# Patient Record
Sex: MALE | Race: OTHER | NOT HISPANIC OR LATINO
[De-identification: names, ages, dates, MRNs, and addresses within clinical notes are randomized per-mention and may not be internally consistent; named-entity substitution may affect disease eponyms.]

---

## 2017-02-27 PROBLEM — Z00.00 ENCOUNTER FOR PREVENTIVE HEALTH EXAMINATION: Status: ACTIVE | Noted: 2017-02-27

## 2017-03-01 ENCOUNTER — APPOINTMENT (OUTPATIENT)
Dept: GASTROENTEROLOGY | Facility: CLINIC | Age: 32
End: 2017-03-01

## 2017-03-01 VITALS
HEIGHT: 71 IN | DIASTOLIC BLOOD PRESSURE: 82 MMHG | OXYGEN SATURATION: 98 % | HEART RATE: 74 BPM | RESPIRATION RATE: 14 BRPM | WEIGHT: 185 LBS | SYSTOLIC BLOOD PRESSURE: 118 MMHG | BODY MASS INDEX: 25.9 KG/M2 | TEMPERATURE: 98.2 F

## 2017-03-01 DIAGNOSIS — Z87.442 PERSONAL HISTORY OF URINARY CALCULI: ICD-10-CM

## 2017-03-01 DIAGNOSIS — D55.0 ANEMIA DUE TO GLUCOSE-6-PHOSPHATE DEHYDROGENASE [G6PD] DEFICIENCY: ICD-10-CM

## 2017-03-01 DIAGNOSIS — Z83.79 FAMILY HISTORY OF OTHER DISEASES OF THE DIGESTIVE SYSTEM: ICD-10-CM

## 2017-03-01 DIAGNOSIS — Z78.9 OTHER SPECIFIED HEALTH STATUS: ICD-10-CM

## 2017-03-01 DIAGNOSIS — K50.919 CROHN'S DISEASE, UNSPECIFIED, WITH UNSPECIFIED COMPLICATIONS: ICD-10-CM

## 2017-03-01 DIAGNOSIS — Z80.8 FAMILY HISTORY OF MALIGNANT NEOPLASM OF OTHER ORGANS OR SYSTEMS: ICD-10-CM

## 2017-03-01 RX ORDER — HYDROCORTISONE 100 MG/60ML
ENEMA RECTAL
Refills: 0 | Status: DISCONTINUED | COMMUNITY

## 2017-03-01 RX ORDER — MESALAMINE 4 G/60ML
4 SUSPENSION RECTAL
Refills: 0 | Status: DISCONTINUED | COMMUNITY

## 2017-03-03 ENCOUNTER — RX RENEWAL (OUTPATIENT)
Age: 32
End: 2017-03-03

## 2017-03-12 ENCOUNTER — FORM ENCOUNTER (OUTPATIENT)
Age: 32
End: 2017-03-12

## 2017-03-13 ENCOUNTER — OUTPATIENT (OUTPATIENT)
Dept: OUTPATIENT SERVICES | Facility: HOSPITAL | Age: 32
LOS: 1 days | End: 2017-03-13
Payer: COMMERCIAL

## 2017-03-13 PROCEDURE — 74177 CT ABD & PELVIS W/CONTRAST: CPT | Mod: 26

## 2017-03-13 PROCEDURE — 74177 CT ABD & PELVIS W/CONTRAST: CPT

## 2017-03-14 ENCOUNTER — APPOINTMENT (OUTPATIENT)
Dept: GASTROENTEROLOGY | Facility: HOSPITAL | Age: 32
End: 2017-03-14

## 2017-03-14 LAB
ALBUMIN SERPL ELPH-MCNC: 4.8 G/DL
ALP BLD-CCNC: 65 U/L
ALT SERPL-CCNC: 11 U/L
ANION GAP SERPL CALC-SCNC: 16 MMOL/L
AST SERPL-CCNC: 14 U/L
BASOPHILS # BLD AUTO: 0.1 K/UL
BASOPHILS NFR BLD AUTO: 1 %
BILIRUB SERPL-MCNC: 0.7 MG/DL
BUN SERPL-MCNC: 8 MG/DL
CALCIUM SERPL-MCNC: 10.2 MG/DL
CHLORIDE SERPL-SCNC: 101 MMOL/L
CO2 SERPL-SCNC: 25 MMOL/L
CREAT SERPL-MCNC: 0.93 MG/DL
EOSINOPHIL # BLD AUTO: 0.38 K/UL
EOSINOPHIL NFR BLD AUTO: 3.9 %
GLUCOSE SERPL-MCNC: 85 MG/DL
HBV CORE IGM SER QL: NONREACTIVE
HBV SURFACE AB SER QL: REACTIVE
HBV SURFACE AB SERPL IA-ACNC: 469.8 MIU/ML
HBV SURFACE AG SER QL: NONREACTIVE
HCT VFR BLD CALC: 47.4 %
HCV AB SER QL: NONREACTIVE
HCV S/CO RATIO: 0.11 S/CO
HGB BLD-MCNC: 15.7 G/DL
IMM GRANULOCYTES NFR BLD AUTO: 0.3 %
LYMPHOCYTES # BLD AUTO: 2.16 K/UL
LYMPHOCYTES NFR BLD AUTO: 22.4 %
MAN DIFF?: NORMAL
MCHC RBC-ENTMCNC: 31.3 PG
MCHC RBC-ENTMCNC: 33.1 GM/DL
MCV RBC AUTO: 94.6 FL
MONOCYTES # BLD AUTO: 0.58 K/UL
MONOCYTES NFR BLD AUTO: 6 %
NEUTROPHILS # BLD AUTO: 6.41 K/UL
NEUTROPHILS NFR BLD AUTO: 66.4 %
PLATELET # BLD AUTO: 184 K/UL
POTASSIUM SERPL-SCNC: 4.1 MMOL/L
PROT SERPL-MCNC: 7.7 G/DL
RBC # BLD: 5.01 M/UL
RBC # FLD: 12.6 %
SODIUM SERPL-SCNC: 142 MMOL/L
VIT B12 SERPL-MCNC: 385 PG/ML
WBC # FLD AUTO: 9.66 K/UL

## 2017-03-16 ENCOUNTER — RESULT REVIEW (OUTPATIENT)
Age: 32
End: 2017-03-16

## 2017-03-17 ENCOUNTER — OUTPATIENT (OUTPATIENT)
Dept: OUTPATIENT SERVICES | Facility: HOSPITAL | Age: 32
LOS: 1 days | Discharge: HOME | End: 2017-03-17
Payer: COMMERCIAL

## 2017-03-17 ENCOUNTER — APPOINTMENT (OUTPATIENT)
Dept: GASTROENTEROLOGY | Facility: HOSPITAL | Age: 32
End: 2017-03-17

## 2017-03-17 DIAGNOSIS — K50.90 CROHN'S DISEASE, UNSPECIFIED, WITHOUT COMPLICATIONS: ICD-10-CM

## 2017-03-17 DIAGNOSIS — K62.89 OTHER SPECIFIED DISEASES OF ANUS AND RECTUM: ICD-10-CM

## 2017-03-17 DIAGNOSIS — Z88.2 ALLERGY STATUS TO SULFONAMIDES: ICD-10-CM

## 2017-03-17 DIAGNOSIS — K64.4 RESIDUAL HEMORRHOIDAL SKIN TAGS: ICD-10-CM

## 2017-03-17 DIAGNOSIS — K61.1 RECTAL ABSCESS: ICD-10-CM

## 2017-03-17 DIAGNOSIS — D55.0 ANEMIA DUE TO GLUCOSE-6-PHOSPHATE DEHYDROGENASE [G6PD] DEFICIENCY: ICD-10-CM

## 2017-03-17 DIAGNOSIS — K52.89 OTHER SPECIFIED NONINFECTIVE GASTROENTERITIS AND COLITIS: ICD-10-CM

## 2017-03-17 DIAGNOSIS — Z88.8 ALLERGY STATUS TO OTHER DRUGS, MEDICAMENTS AND BIOLOGICAL SUBSTANCES STATUS: ICD-10-CM

## 2017-03-17 PROCEDURE — 88305 TISSUE EXAM BY PATHOLOGIST: CPT

## 2017-03-17 PROCEDURE — 45380 COLONOSCOPY AND BIOPSY: CPT

## 2017-03-17 PROCEDURE — 45380 COLONOSCOPY AND BIOPSY: CPT | Mod: GC

## 2017-03-18 LAB
C DIFF GDH STL QL: NEGATIVE — SIGNIFICANT CHANGE UP
C DIFF GDH STL QL: SIGNIFICANT CHANGE UP

## 2017-03-21 ENCOUNTER — RX RENEWAL (OUTPATIENT)
Age: 32
End: 2017-03-21

## 2017-03-21 LAB
SURGICAL PATHOLOGY STUDY: SIGNIFICANT CHANGE UP
TPMT ENZYME: NORMAL

## 2017-04-06 ENCOUNTER — APPOINTMENT (OUTPATIENT)
Dept: GASTROENTEROLOGY | Facility: CLINIC | Age: 32
End: 2017-04-06

## 2017-04-06 VITALS
RESPIRATION RATE: 14 BRPM | SYSTOLIC BLOOD PRESSURE: 124 MMHG | HEART RATE: 70 BPM | OXYGEN SATURATION: 98 % | TEMPERATURE: 97.9 F | HEIGHT: 71 IN | WEIGHT: 178 LBS | DIASTOLIC BLOOD PRESSURE: 84 MMHG | BODY MASS INDEX: 24.92 KG/M2

## 2017-04-10 LAB
ADJUSTED MITOGEN: >10 IU/ML
ADJUSTED TB AG: 0 IU/ML
M TB IFN-G BLD-IMP: NEGATIVE
QUANTIFERON GOLD NIL: 0.05 IU/ML

## 2017-05-02 ENCOUNTER — RX RENEWAL (OUTPATIENT)
Age: 32
End: 2017-05-02

## 2017-05-30 ENCOUNTER — RX RENEWAL (OUTPATIENT)
Age: 32
End: 2017-05-30

## 2017-07-20 ENCOUNTER — RX RENEWAL (OUTPATIENT)
Age: 32
End: 2017-07-20

## 2017-08-04 ENCOUNTER — APPOINTMENT (OUTPATIENT)
Dept: GASTROENTEROLOGY | Facility: CLINIC | Age: 32
End: 2017-08-04
Payer: COMMERCIAL

## 2017-08-04 VITALS
HEART RATE: 71 BPM | WEIGHT: 174 LBS | BODY MASS INDEX: 24.36 KG/M2 | SYSTOLIC BLOOD PRESSURE: 114 MMHG | TEMPERATURE: 98.2 F | HEIGHT: 71 IN | OXYGEN SATURATION: 98 % | RESPIRATION RATE: 14 BRPM | DIASTOLIC BLOOD PRESSURE: 76 MMHG

## 2017-08-04 PROCEDURE — 99214 OFFICE O/P EST MOD 30 MIN: CPT | Mod: 25

## 2017-08-04 PROCEDURE — 36415 COLL VENOUS BLD VENIPUNCTURE: CPT

## 2017-08-07 LAB
ALBUMIN SERPL ELPH-MCNC: 4.4 G/DL
ALP BLD-CCNC: 63 U/L
ALT SERPL-CCNC: 7 U/L
ANION GAP SERPL CALC-SCNC: 14 MMOL/L
AST SERPL-CCNC: 18 U/L
BASOPHILS # BLD AUTO: 0.05 K/UL
BASOPHILS NFR BLD AUTO: 1 %
BILIRUB SERPL-MCNC: 0.7 MG/DL
BUN SERPL-MCNC: 7 MG/DL
CALCIUM SERPL-MCNC: 10 MG/DL
CHLORIDE SERPL-SCNC: 103 MMOL/L
CO2 SERPL-SCNC: 21 MMOL/L
CREAT SERPL-MCNC: 0.92 MG/DL
CRP SERPL-MCNC: <0.2 MG/DL
EOSINOPHIL # BLD AUTO: 0.29 K/UL
EOSINOPHIL NFR BLD AUTO: 5.6 %
GLUCOSE SERPL-MCNC: 77 MG/DL
HCT VFR BLD CALC: 44.8 %
HGB BLD-MCNC: 14.7 G/DL
IMM GRANULOCYTES NFR BLD AUTO: 0.4 %
LYMPHOCYTES # BLD AUTO: 2.02 K/UL
LYMPHOCYTES NFR BLD AUTO: 38.9 %
MAN DIFF?: NORMAL
MCHC RBC-ENTMCNC: 31.1 PG
MCHC RBC-ENTMCNC: 32.8 GM/DL
MCV RBC AUTO: 94.9 FL
MONOCYTES # BLD AUTO: 0.55 K/UL
MONOCYTES NFR BLD AUTO: 10.6 %
NEUTROPHILS # BLD AUTO: 2.26 K/UL
NEUTROPHILS NFR BLD AUTO: 43.5 %
PLATELET # BLD AUTO: 189 K/UL
POTASSIUM SERPL-SCNC: 4.1 MMOL/L
PROT SERPL-MCNC: 7.5 G/DL
RBC # BLD: 4.72 M/UL
RBC # FLD: 12.9 %
SODIUM SERPL-SCNC: 138 MMOL/L
WBC # FLD AUTO: 5.19 K/UL

## 2017-08-23 ENCOUNTER — RX RENEWAL (OUTPATIENT)
Age: 32
End: 2017-08-23

## 2017-09-20 ENCOUNTER — APPOINTMENT (OUTPATIENT)
Dept: GASTROENTEROLOGY | Facility: CLINIC | Age: 32
End: 2017-09-20
Payer: COMMERCIAL

## 2017-09-20 ENCOUNTER — LABORATORY RESULT (OUTPATIENT)
Age: 32
End: 2017-09-20

## 2017-09-20 VITALS
RESPIRATION RATE: 14 BRPM | TEMPERATURE: 98.2 F | HEART RATE: 59 BPM | DIASTOLIC BLOOD PRESSURE: 78 MMHG | OXYGEN SATURATION: 98 % | HEIGHT: 71 IN | SYSTOLIC BLOOD PRESSURE: 100 MMHG | WEIGHT: 184 LBS | BODY MASS INDEX: 25.76 KG/M2

## 2017-09-20 PROCEDURE — 99214 OFFICE O/P EST MOD 30 MIN: CPT | Mod: GC

## 2017-10-02 ENCOUNTER — RESULT REVIEW (OUTPATIENT)
Age: 32
End: 2017-10-02

## 2017-10-11 LAB — CALPROTECTIN FECAL: 172 UG/G

## 2017-10-30 ENCOUNTER — RX RENEWAL (OUTPATIENT)
Age: 32
End: 2017-10-30

## 2017-11-21 ENCOUNTER — RX RENEWAL (OUTPATIENT)
Age: 32
End: 2017-11-21

## 2017-12-07 ENCOUNTER — APPOINTMENT (OUTPATIENT)
Dept: GASTROENTEROLOGY | Facility: HOSPITAL | Age: 32
End: 2017-12-07

## 2017-12-27 ENCOUNTER — RX RENEWAL (OUTPATIENT)
Age: 32
End: 2017-12-27

## 2018-01-02 ENCOUNTER — RX RENEWAL (OUTPATIENT)
Age: 33
End: 2018-01-02

## 2018-02-27 ENCOUNTER — OTHER (OUTPATIENT)
Age: 33
End: 2018-02-27

## 2018-02-28 LAB
ALBUMIN SERPL ELPH-MCNC: 4.6 G/DL
ALP BLD-CCNC: 56 U/L
ALT SERPL-CCNC: 12 U/L
ANION GAP SERPL CALC-SCNC: 13 MMOL/L
AST SERPL-CCNC: 20 U/L
BASOPHILS # BLD AUTO: 0.02 K/UL
BASOPHILS NFR BLD AUTO: 0.3 %
BILIRUB SERPL-MCNC: 0.7 MG/DL
BUN SERPL-MCNC: 13 MG/DL
CALCIUM SERPL-MCNC: 9.5 MG/DL
CHLORIDE SERPL-SCNC: 104 MMOL/L
CO2 SERPL-SCNC: 24 MMOL/L
CREAT SERPL-MCNC: 1.16 MG/DL
CRP SERPL-MCNC: <0.2 MG/DL
EOSINOPHIL # BLD AUTO: 0.02 K/UL
EOSINOPHIL NFR BLD AUTO: 0.3 %
GLUCOSE SERPL-MCNC: 83 MG/DL
HCT VFR BLD CALC: 45.1 %
HGB BLD-MCNC: 14.5 G/DL
IMM GRANULOCYTES NFR BLD AUTO: 0.3 %
LYMPHOCYTES # BLD AUTO: 2.09 K/UL
LYMPHOCYTES NFR BLD AUTO: 33.7 %
MAN DIFF?: NORMAL
MCHC RBC-ENTMCNC: 30.5 PG
MCHC RBC-ENTMCNC: 32.2 GM/DL
MCV RBC AUTO: 94.7 FL
MONOCYTES # BLD AUTO: 0.41 K/UL
MONOCYTES NFR BLD AUTO: 6.6 %
NEUTROPHILS # BLD AUTO: 3.64 K/UL
NEUTROPHILS NFR BLD AUTO: 58.8 %
PLATELET # BLD AUTO: 151 K/UL
POTASSIUM SERPL-SCNC: 4.2 MMOL/L
PROT SERPL-MCNC: 7.4 G/DL
RBC # BLD: 4.76 M/UL
RBC # FLD: 12.3 %
SODIUM SERPL-SCNC: 141 MMOL/L
WBC # FLD AUTO: 6.2 K/UL

## 2018-03-12 ENCOUNTER — APPOINTMENT (OUTPATIENT)
Dept: GASTROENTEROLOGY | Facility: HOSPITAL | Age: 33
End: 2018-03-12

## 2018-03-12 ENCOUNTER — OUTPATIENT (OUTPATIENT)
Dept: OUTPATIENT SERVICES | Facility: HOSPITAL | Age: 33
LOS: 1 days | Discharge: HOME | End: 2018-03-12
Payer: COMMERCIAL

## 2018-03-12 ENCOUNTER — RESULT REVIEW (OUTPATIENT)
Age: 33
End: 2018-03-12

## 2018-03-12 PROCEDURE — 88305 TISSUE EXAM BY PATHOLOGIST: CPT

## 2018-03-12 PROCEDURE — 45380 COLONOSCOPY AND BIOPSY: CPT

## 2018-03-14 LAB — SURGICAL PATHOLOGY STUDY: SIGNIFICANT CHANGE UP

## 2018-03-15 DIAGNOSIS — Z88.2 ALLERGY STATUS TO SULFONAMIDES: ICD-10-CM

## 2018-03-15 DIAGNOSIS — Z87.19 PERSONAL HISTORY OF OTHER DISEASES OF THE DIGESTIVE SYSTEM: ICD-10-CM

## 2018-03-15 DIAGNOSIS — Z09 ENCOUNTER FOR FOLLOW-UP EXAMINATION AFTER COMPLETED TREATMENT FOR CONDITIONS OTHER THAN MALIGNANT NEOPLASM: ICD-10-CM

## 2018-03-15 DIAGNOSIS — K63.89 OTHER SPECIFIED DISEASES OF INTESTINE: ICD-10-CM

## 2018-04-16 ENCOUNTER — RX RENEWAL (OUTPATIENT)
Age: 33
End: 2018-04-16

## 2018-04-18 ENCOUNTER — APPOINTMENT (OUTPATIENT)
Dept: GASTROENTEROLOGY | Facility: CLINIC | Age: 33
End: 2018-04-18

## 2018-05-14 ENCOUNTER — RX RENEWAL (OUTPATIENT)
Age: 33
End: 2018-05-14

## 2018-05-23 ENCOUNTER — LABORATORY RESULT (OUTPATIENT)
Age: 33
End: 2018-05-23

## 2018-05-23 ENCOUNTER — APPOINTMENT (OUTPATIENT)
Dept: GASTROENTEROLOGY | Facility: CLINIC | Age: 33
End: 2018-05-23
Payer: COMMERCIAL

## 2018-05-23 VITALS
RESPIRATION RATE: 14 BRPM | DIASTOLIC BLOOD PRESSURE: 80 MMHG | SYSTOLIC BLOOD PRESSURE: 140 MMHG | HEIGHT: 71 IN | BODY MASS INDEX: 26.18 KG/M2 | TEMPERATURE: 98 F | OXYGEN SATURATION: 96 % | WEIGHT: 187 LBS | HEART RATE: 67 BPM

## 2018-05-23 PROCEDURE — 36415 COLL VENOUS BLD VENIPUNCTURE: CPT

## 2018-05-23 PROCEDURE — 99214 OFFICE O/P EST MOD 30 MIN: CPT | Mod: 25

## 2018-05-23 RX ORDER — ECONAZOLE NITRATE 10 MG/G
1 CREAM TOPICAL
Qty: 85 | Refills: 0 | Status: ACTIVE | COMMUNITY
Start: 2018-01-31

## 2018-05-25 ENCOUNTER — MOBILE ON CALL (OUTPATIENT)
Age: 33
End: 2018-05-25

## 2018-05-30 LAB
ALBUMIN SERPL ELPH-MCNC: 4.6 G/DL
ALP BLD-CCNC: 59 U/L
ALT SERPL-CCNC: 14 U/L
ANION GAP SERPL CALC-SCNC: 18 MMOL/L
AST SERPL-CCNC: 19 U/L
BASOPHILS # BLD AUTO: 0.07 K/UL
BASOPHILS NFR BLD AUTO: 0.9 %
BILIRUB SERPL-MCNC: 0.5 MG/DL
BUN SERPL-MCNC: 14 MG/DL
CALCIUM SERPL-MCNC: 10.1 MG/DL
CHLORIDE SERPL-SCNC: 100 MMOL/L
CO2 SERPL-SCNC: 23 MMOL/L
CREAT SERPL-MCNC: 1 MG/DL
CRP SERPL-MCNC: 0.2 MG/DL
EOSINOPHIL # BLD AUTO: 0 K/UL
EOSINOPHIL NFR BLD AUTO: 0 %
GLUCOSE SERPL-MCNC: 51 MG/DL
HCT VFR BLD CALC: 45.2 %
HGB BLD-MCNC: 14.9 G/DL
LYMPHOCYTES # BLD AUTO: 3.62 K/UL
LYMPHOCYTES NFR BLD AUTO: 43.9 %
MAN DIFF?: NORMAL
MCHC RBC-ENTMCNC: 31.7 PG
MCHC RBC-ENTMCNC: 33 GM/DL
MCV RBC AUTO: 96.2 FL
MONOCYTES # BLD AUTO: 0.65 K/UL
MONOCYTES NFR BLD AUTO: 7.9 %
NEUTROPHILS # BLD AUTO: 3.9 K/UL
NEUTROPHILS NFR BLD AUTO: 47.3 %
PLATELET # BLD AUTO: 167 K/UL
POTASSIUM SERPL-SCNC: 3.7 MMOL/L
PROT SERPL-MCNC: 7.6 G/DL
RBC # BLD: 4.7 M/UL
RBC # FLD: 12.6 %
SODIUM SERPL-SCNC: 141 MMOL/L
WBC # FLD AUTO: 8.24 K/UL

## 2018-07-05 ENCOUNTER — RX RENEWAL (OUTPATIENT)
Age: 33
End: 2018-07-05

## 2018-07-12 ENCOUNTER — RX RENEWAL (OUTPATIENT)
Age: 33
End: 2018-07-12

## 2018-09-17 ENCOUNTER — RX RENEWAL (OUTPATIENT)
Age: 33
End: 2018-09-17

## 2018-09-24 ENCOUNTER — RX RENEWAL (OUTPATIENT)
Age: 33
End: 2018-09-24

## 2018-10-02 ENCOUNTER — RX RENEWAL (OUTPATIENT)
Age: 33
End: 2018-10-02

## 2018-11-15 ENCOUNTER — APPOINTMENT (OUTPATIENT)
Dept: GASTROENTEROLOGY | Facility: CLINIC | Age: 33
End: 2018-11-15
Payer: COMMERCIAL

## 2018-11-15 VITALS
HEART RATE: 60 BPM | WEIGHT: 195 LBS | DIASTOLIC BLOOD PRESSURE: 70 MMHG | OXYGEN SATURATION: 98 % | SYSTOLIC BLOOD PRESSURE: 110 MMHG | TEMPERATURE: 98.3 F | RESPIRATION RATE: 14 BRPM | BODY MASS INDEX: 27.3 KG/M2 | HEIGHT: 71 IN

## 2018-11-15 DIAGNOSIS — K50.90 CROHN'S DISEASE, UNSPECIFIED, W/OUT COMPLICATIONS: ICD-10-CM

## 2018-11-15 DIAGNOSIS — Z23 ENCOUNTER FOR IMMUNIZATION: ICD-10-CM

## 2018-11-15 PROCEDURE — 99213 OFFICE O/P EST LOW 20 MIN: CPT | Mod: 25

## 2018-11-15 PROCEDURE — 36415 COLL VENOUS BLD VENIPUNCTURE: CPT

## 2018-11-21 LAB
ANION GAP SERPL CALC-SCNC: 13 MMOL/L
BASOPHILS # BLD AUTO: 0.04 K/UL
BASOPHILS NFR BLD AUTO: 0.4 %
BUN SERPL-MCNC: 11 MG/DL
CALCIUM SERPL-MCNC: 9.9 MG/DL
CHLORIDE SERPL-SCNC: 103 MMOL/L
CO2 SERPL-SCNC: 23 MMOL/L
CREAT SERPL-MCNC: 0.9 MG/DL
CRP SERPL-MCNC: <0.1 MG/DL
EOSINOPHIL # BLD AUTO: 0.1 K/UL
EOSINOPHIL NFR BLD AUTO: 1.1 %
GLUCOSE SERPL-MCNC: 77 MG/DL
HCT VFR BLD CALC: 47.6 %
HGB BLD-MCNC: 15.9 G/DL
IMM GRANULOCYTES NFR BLD AUTO: 0.3 %
LYMPHOCYTES # BLD AUTO: 2.91 K/UL
LYMPHOCYTES NFR BLD AUTO: 32.6 %
MAN DIFF?: NORMAL
MCHC RBC-ENTMCNC: 30.9 PG
MCHC RBC-ENTMCNC: 33.4 GM/DL
MCV RBC AUTO: 92.4 FL
MONOCYTES # BLD AUTO: 0.55 K/UL
MONOCYTES NFR BLD AUTO: 6.2 %
NEUTROPHILS # BLD AUTO: 5.3 K/UL
NEUTROPHILS NFR BLD AUTO: 59.4 %
PLATELET # BLD AUTO: 181 K/UL
POTASSIUM SERPL-SCNC: 4.6 MMOL/L
RBC # BLD: 5.15 M/UL
RBC # FLD: 12.7 %
SODIUM SERPL-SCNC: 139 MMOL/L
WBC # FLD AUTO: 8.93 K/UL

## 2018-12-18 ENCOUNTER — RX RENEWAL (OUTPATIENT)
Age: 33
End: 2018-12-18

## 2019-01-18 ENCOUNTER — RX RENEWAL (OUTPATIENT)
Age: 34
End: 2019-01-18

## 2019-03-21 ENCOUNTER — RX RENEWAL (OUTPATIENT)
Age: 34
End: 2019-03-21

## 2019-05-14 ENCOUNTER — APPOINTMENT (OUTPATIENT)
Dept: GASTROENTEROLOGY | Facility: CLINIC | Age: 34
End: 2019-05-14
Payer: COMMERCIAL

## 2019-05-14 VITALS
HEART RATE: 65 BPM | BODY MASS INDEX: 25.2 KG/M2 | SYSTOLIC BLOOD PRESSURE: 130 MMHG | WEIGHT: 180 LBS | RESPIRATION RATE: 14 BRPM | OXYGEN SATURATION: 98 % | HEIGHT: 71 IN | DIASTOLIC BLOOD PRESSURE: 80 MMHG | TEMPERATURE: 97.6 F

## 2019-05-14 PROCEDURE — 99214 OFFICE O/P EST MOD 30 MIN: CPT | Mod: 25

## 2019-05-14 PROCEDURE — 36415 COLL VENOUS BLD VENIPUNCTURE: CPT

## 2019-05-14 RX ORDER — IBUPROFEN 200 MG
500-125 CAPSULE ORAL DAILY
Qty: 30 | Refills: 2 | Status: DISCONTINUED | COMMUNITY
Start: 2017-03-01 | End: 2019-05-14

## 2019-05-14 RX ORDER — MESALAMINE 1.2 G/1
1.2 TABLET, DELAYED RELEASE ORAL
Qty: 120 | Refills: 2 | Status: DISCONTINUED | COMMUNITY
Start: 2017-12-27 | End: 2019-05-14

## 2019-05-14 RX ORDER — ADALIMUMAB 40MG/0.8ML
40 KIT SUBCUTANEOUS
Qty: 2 | Refills: 2 | Status: DISCONTINUED | COMMUNITY
Start: 2017-07-20 | End: 2019-05-14

## 2019-05-14 RX ORDER — BUDESONIDE 3 MG/1
3 CAPSULE, COATED PELLETS ORAL
Qty: 1 | Refills: 0 | Status: DISCONTINUED | COMMUNITY
Start: 2018-05-23 | End: 2019-05-14

## 2019-05-14 RX ORDER — ADALIMUMAB 40MG/0.8ML
40 KIT SUBCUTANEOUS
Qty: 1 | Refills: 0 | Status: DISCONTINUED | COMMUNITY
Start: 2017-06-28 | End: 2019-05-14

## 2019-05-14 RX ORDER — BUDESONIDE 9 MG/1
9 TABLET, EXTENDED RELEASE ORAL
Qty: 30 | Refills: 2 | Status: DISCONTINUED | COMMUNITY
Start: 2017-03-01 | End: 2019-05-14

## 2019-05-14 NOTE — PHYSICAL EXAM
[General Appearance - In No Acute Distress] : in no acute distress [General Appearance - Alert] : alert [Sclera] : the sclera and conjunctiva were normal [General Appearance - Well Nourished] : well nourished [Outer Ear] : the ears and nose were normal in appearance [Oropharynx] : the oropharynx was normal [Neck Appearance] : the appearance of the neck was normal [Neck Cervical Mass (___cm)] : no neck mass was observed [] : no respiratory distress [Abdomen Soft] : soft [Bowel Sounds] : normal bowel sounds [Abdomen Tenderness] : non-tender [Cervical Lymph Nodes Enlarged Posterior Bilaterally] : posterior cervical [Cervical Lymph Nodes Enlarged Anterior Bilaterally] : anterior cervical [No CVA Tenderness] : no ~M costovertebral angle tenderness [Abnormal Walk] : normal gait [No Focal Deficits] : no focal deficits [Skin Color & Pigmentation] : normal skin color and pigmentation [Oriented To Time, Place, And Person] : oriented to person, place, and time

## 2019-05-14 NOTE — HISTORY OF PRESENT ILLNESS
[Heartburn] : denies heartburn [Vomiting] : denies vomiting [Nausea] : denies nausea [Constipation] : denies constipation [Diarrhea] : denies diarrhea [Yellow Skin Or Eyes (Jaundice)] : denies jaundice [Abdominal Pain] : denies abdominal pain [Rectal Pain] : denies rectal pain [Abdominal Swelling] : abdominal swelling stable [Kidney Stone] : kidney stone [Inflammatory Bowel Disease] : inflammatory bowel disease [Good Compliance] : good compliance with treatment [GERD] : no gastroesophageal reflux disease [Peptic Ulcer Disease] : no peptic ulcer disease [Hiatus Hernia] : no hiatus hernia [Cholelithiasis] : no cholelithiasis [Pancreatitis] : no pancreatitis [Irritable Bowel Syndrome] : no irritable bowel syndrome [Diverticulitis] : no diverticulitis [Alcohol Abuse] : no alcohol abuse [Malignancy] : no malignancy [Abdominal Surgery] : no abdominal surgery [Appendectomy] : no appendectomy [de-identified] : 33 M, h/o IBD and G6PD deficiency, presents today on Humira eow feeling well. Recent c-scope revealed mild erythema in rectum with histologic remission. \par \par Abdominal bloating over Passover with one episode of BRBPR. Took laxative and Canasa with relief. Having 1-2 formed BMs, can increase amount depending on foods. No n/v. Appetite good, has lost 15 lbs since November due to restricting calorie intake intentionally. No EIMs. \par \par Started seeing psych for depression lately, on zoloft and lamictal. Also getting therapy. \par \par Suffered from influenza in February for 1 week then bronchitis, given tamiflu and Zpack \par \par Traveling to [facility] with business, they raised money to give water/electricity to a village there. \par \par ADA level in 2017 13.8 with no antibodies \par \par CT scan March 2017 WNL \par \par PAST DISEASE HISTORY\par 31 year old M with hx of g6pd deficiency, Crohn's disease (documentation of ileocolitis, visualization of rectosigmoid involvement on sigmoidoscopy) dx in 2013 with sx of abdominal bloating, blood in the stool. He has had symptoms since dx in 2013 and has never been in remission. Initially he was on Canasa+Lialda and then switched to Rowasa and Hydrocortisone enema which he stopped a few weeks ago because of burden of administration without relief of symptoms. \par \par He is currently taking Lialda but ran out of medications 2 days ago. He has 1-4 bowel movements/day, described as formed or loose which varies daily. He has persistent blood in stool each day, with tenesmus and denies black stools. He states within 1 hour of eating he has abdominal bloating sensation and abdominal protrusion. Symptoms are slightly improved from diagnosis, but no hx of resolution. \par \par He denies nausea, vomiting, fever, chills, joint pains, eye pain, rectal pain, oral ulcers, fistula/stricture formation, fecal incontinence or urgency. \par \par Dx studies on file: \par His last sigmoidoscopy was in Aug 2016 by Dr. Lay which was significant for edema and erythema in the rectum and sigmoid. Previous colonoscopy in 2014 was reviewed, with similar findings. No biopsy results available to review. VCE 2016 revealed mild edema and erythema in sigmoid colon and rectum. \par \par Social hx: wine few times per week, no marijuana use, no tobacco use, cocaine use twice in the last 6 months, E-cigarette use daily, avoiding NSAIDs  [Cholecystectomy] : no cholecystectomy [de-identified] : blood in stool

## 2019-05-14 NOTE — ASSESSMENT
[FreeTextEntry1] : 33 Y M with moderate IBD unspecified, diagnosed 2013-14 (no path from 2014), in clinical, endo and histo remission on ADA eow. Also has some IBS symptoms, managed with judicious diet.\par \par IBD unspecified\par - continue ADA [id number] given significant improvement clinically and endoscopically; last level in 2017 13.8 with no antibodies\par - tolerating citrate free formulation better \par - diet as tolerated, avoid food triggers\par - cont Lialda daily, Canasa PRN \par - cbc, cmp and crp \par - cscope 2021 for surveillance after 8 years of disease \par \par Depression:\par - new meds; stable; being f/b psych and psychologist.\par \par Travel to [facility] in June\par - saw travel med MD yesterday and received typhoid\par - checking MMR, varicella, Hep A/B/C antibody today as per recommendation from travel MD\par - given malaria prophylaxis\par - given levaquin and antidiarrheal from travel MD for traveler's diarrhea\par - now receiving Yellow fever (exemption letter from travel MD given)\par - pending meningitis and Tdap vaccine history from [facility] \par \par HCM\par - flu shot UTD Nov 2018\par - checking MMR, varicella, hep A/B/C antibody today due to travel to [facility] in June\par - annual derm visit - due now and emphasized importance \par - check TB status when returns from [facility]  \par - continue Vit D and Ca\par - check Vit D, B12 and iron level \par - needs to establish care with PCP  \par - consider PCV with PCP\par - DEXA scan ordered given hx of Budesonide for > 3 mo\par - tobacco/E-cig cessation discussed in detail\par - cont to avoid NSAIDs\par \par RTC 4 months \par

## 2019-05-14 NOTE — HISTORY OF PRESENT ILLNESS
[Heartburn] : denies heartburn [Vomiting] : denies vomiting [Nausea] : denies nausea [Constipation] : denies constipation [Diarrhea] : denies diarrhea [Yellow Skin Or Eyes (Jaundice)] : denies jaundice [Abdominal Pain] : denies abdominal pain [Abdominal Swelling] : abdominal swelling stable [Rectal Pain] : denies rectal pain [Kidney Stone] : kidney stone [Inflammatory Bowel Disease] : inflammatory bowel disease [Good Compliance] : good compliance with treatment [GERD] : no gastroesophageal reflux disease [Peptic Ulcer Disease] : no peptic ulcer disease [Hiatus Hernia] : no hiatus hernia [Cholelithiasis] : no cholelithiasis [Pancreatitis] : no pancreatitis [Irritable Bowel Syndrome] : no irritable bowel syndrome [Diverticulitis] : no diverticulitis [Alcohol Abuse] : no alcohol abuse [Malignancy] : no malignancy [Abdominal Surgery] : no abdominal surgery [Appendectomy] : no appendectomy [Cholecystectomy] : no cholecystectomy [de-identified] : 33 M, h/o IBD and G6PD deficiency, presents today on Humira eow feeling well. Recent c-scope revealed mild erythema in rectum with histologic remission. \par \par Abdominal bloating over Passover with one episode of BRBPR. Took laxative and Canasa with relief. Having 1-2 formed BMs, can increase amount depending on foods. No n/v. Appetite good, has lost 15 lbs since November due to restricting calorie intake intentionally. No EIMs. \par \par Started seeing psych for depression lately, on zoloft and lamictal. Also getting therapy. \par \par Suffered from influenza in February for 1 week then bronchitis, given tamiflu and Zpack \par \par Traveling to [facility] with business, they raised money to give water/electricity to a village there. \par \par ADA level in 2017 13.8 with no antibodies \par \par CT scan March 2017 WNL \par \par PAST DISEASE HISTORY\par 31 year old M with hx of g6pd deficiency, Crohn's disease (documentation of ileocolitis, visualization of rectosigmoid involvement on sigmoidoscopy) dx in 2013 with sx of abdominal bloating, blood in the stool. He has had symptoms since dx in 2013 and has never been in remission. Initially he was on Canasa+Lialda and then switched to Rowasa and Hydrocortisone enema which he stopped a few weeks ago because of burden of administration without relief of symptoms. \par \par He is currently taking Lialda but ran out of medications 2 days ago. He has 1-4 bowel movements/day, described as formed or loose which varies daily. He has persistent blood in stool each day, with tenesmus and denies black stools. He states within 1 hour of eating he has abdominal bloating sensation and abdominal protrusion. Symptoms are slightly improved from diagnosis, but no hx of resolution. \par \par He denies nausea, vomiting, fever, chills, joint pains, eye pain, rectal pain, oral ulcers, fistula/stricture formation, fecal incontinence or urgency. \par \par Dx studies on file: \par His last sigmoidoscopy was in Aug 2016 by Dr. Lay which was significant for edema and erythema in the rectum and sigmoid. Previous colonoscopy in 2014 was reviewed, with similar findings. No biopsy results available to review. VCE 2016 revealed mild edema and erythema in sigmoid colon and rectum. \par \par Social hx: wine few times per week, no marijuana use, no tobacco use, cocaine use twice in the last 6 months, E-cigarette use daily, avoiding NSAIDs  [de-identified] : blood in stool

## 2019-05-14 NOTE — PHYSICAL EXAM
[General Appearance - Alert] : alert [General Appearance - In No Acute Distress] : in no acute distress [Sclera] : the sclera and conjunctiva were normal [General Appearance - Well Nourished] : well nourished [Outer Ear] : the ears and nose were normal in appearance [Oropharynx] : the oropharynx was normal [Neck Appearance] : the appearance of the neck was normal [Neck Cervical Mass (___cm)] : no neck mass was observed [] : no respiratory distress [Abdomen Soft] : soft [Abdomen Tenderness] : non-tender [Bowel Sounds] : normal bowel sounds [Cervical Lymph Nodes Enlarged Posterior Bilaterally] : posterior cervical [Cervical Lymph Nodes Enlarged Anterior Bilaterally] : anterior cervical [Abnormal Walk] : normal gait [No CVA Tenderness] : no ~M costovertebral angle tenderness [Skin Color & Pigmentation] : normal skin color and pigmentation [No Focal Deficits] : no focal deficits [Oriented To Time, Place, And Person] : oriented to person, place, and time

## 2019-05-14 NOTE — ASSESSMENT
[FreeTextEntry1] : 33 Y M with moderate IBD unspecified, diagnosed 2013-14 (no path from 2014), in clinical, endo and histo remission on ADA eow. Also has some IBS symptoms, managed with judicious diet.\par \par IBD unspecified\par - continue ADA [id number] given significant improvement clinically and endoscopically; last level in 2017 13.8 with no antibodies\par - tolerating citrate free formulation better \par - diet as tolerated, avoid food triggers\par - cont Lialda daily, Canasa PRN \par - cbc, cmp and crp \par - cscope 2021 for surveillance after 8 years of disease \par \par Depression:\par - new meds; stable; being f/b psych and psychologist.\par \par Travel to [facility] in June\par - saw travel med MD yesterday and received typhoid\par - checking MMR, varicella, Hep A/B/C antibody today as per recommendation from travel MD\par - given malaria prophylaxis\par - given levaquin and antidiarrheal from travel MD for traveler's diarrhea\par - now receiving Yellow fever (exemption letter from travel MD given)\par - pending meningitis and Tdap vaccine history from [facility] \par \par HCM\par - flu shot UTD Nov 2018\par - checking MMR, varicella, hep A/B/C antibody today due to travel to [facility] in June\par - annual derm visit - due now and emphasized importance \par - check TB status when returns from [facility]  \par - continue Vit D and Ca\par - check Vit D, B12 and iron level \par - needs to establish care with PCP  \par - consider PCV with PCP\par - DEXA scan ordered given hx of Budesonide for > 3 mo\par - tobacco/E-cig cessation discussed in detail\par - cont to avoid NSAIDs\par \par RTC 4 months \par

## 2019-05-15 ENCOUNTER — RX RENEWAL (OUTPATIENT)
Age: 34
End: 2019-05-15

## 2019-05-15 LAB
25(OH)D3 SERPL-MCNC: 37.2 NG/ML
ALBUMIN SERPL ELPH-MCNC: 5.8 G/DL
ALP BLD-CCNC: 74 U/L
ALT SERPL-CCNC: 15 U/L
ANION GAP SERPL CALC-SCNC: 15 MMOL/L
AST SERPL-CCNC: 13 U/L
BASOPHILS # BLD AUTO: 0.06 K/UL
BASOPHILS NFR BLD AUTO: 0.7 %
BILIRUB SERPL-MCNC: 0.7 MG/DL
BUN SERPL-MCNC: 9 MG/DL
CALCIUM SERPL-MCNC: 10.6 MG/DL
CHLORIDE SERPL-SCNC: 99 MMOL/L
CO2 SERPL-SCNC: 27 MMOL/L
CREAT SERPL-MCNC: 0.98 MG/DL
CRP SERPL-MCNC: 0.23 MG/DL
EOSINOPHIL # BLD AUTO: 0.06 K/UL
EOSINOPHIL NFR BLD AUTO: 0.7 %
FOLATE SERPL-MCNC: 8.9 NG/ML
GLUCOSE SERPL-MCNC: 96 MG/DL
HBV SURFACE AB SER QL: REACTIVE
HBV SURFACE AG SER QL: NONREACTIVE
HCT VFR BLD CALC: 53 %
HCV AB SER QL: NONREACTIVE
HCV S/CO RATIO: 0.11 S/CO
HEPATITIS A IGG ANTIBODY: REACTIVE
HGB BLD-MCNC: 17 G/DL
IMM GRANULOCYTES NFR BLD AUTO: 0.5 %
IRON SATN MFR SERPL: 39 %
IRON SERPL-MCNC: 144 UG/DL
LYMPHOCYTES # BLD AUTO: 2.24 K/UL
LYMPHOCYTES NFR BLD AUTO: 26.5 %
MAN DIFF?: NORMAL
MCHC RBC-ENTMCNC: 30.9 PG
MCHC RBC-ENTMCNC: 32.1 GM/DL
MCV RBC AUTO: 96.4 FL
MEV IGG FLD QL IA: 46.2 AU/ML
MEV IGG+IGM SER-IMP: POSITIVE
MONOCYTES # BLD AUTO: 0.51 K/UL
MONOCYTES NFR BLD AUTO: 6 %
MUV AB SER-ACNC: POSITIVE
MUV IGG SER QL IA: 145 AU/ML
NEUTROPHILS # BLD AUTO: 5.55 K/UL
NEUTROPHILS NFR BLD AUTO: 65.6 %
PLATELET # BLD AUTO: 190 K/UL
POTASSIUM SERPL-SCNC: 4.2 MMOL/L
PROT SERPL-MCNC: 8.4 G/DL
RBC # BLD: 5.5 M/UL
RBC # FLD: 11.7 %
RUBV IGG FLD-ACNC: 3.6 INDEX
RUBV IGG SER-IMP: POSITIVE
SODIUM SERPL-SCNC: 141 MMOL/L
TIBC SERPL-MCNC: 372 UG/DL
UIBC SERPL-MCNC: 228 UG/DL
VIT B12 SERPL-MCNC: 671 PG/ML
VZV AB TITR SER: POSITIVE
VZV IGG SER IF-ACNC: >4000 INDEX
WBC # FLD AUTO: 8.46 K/UL

## 2019-07-08 ENCOUNTER — RX RENEWAL (OUTPATIENT)
Age: 34
End: 2019-07-08

## 2019-08-05 ENCOUNTER — RX RENEWAL (OUTPATIENT)
Age: 34
End: 2019-08-05

## 2019-08-29 ENCOUNTER — RX RENEWAL (OUTPATIENT)
Age: 34
End: 2019-08-29

## 2019-10-25 ENCOUNTER — RX RENEWAL (OUTPATIENT)
Age: 34
End: 2019-10-25

## 2019-11-25 ENCOUNTER — RX RENEWAL (OUTPATIENT)
Age: 34
End: 2019-11-25

## 2020-02-10 ENCOUNTER — APPOINTMENT (OUTPATIENT)
Dept: GASTROENTEROLOGY | Facility: CLINIC | Age: 35
End: 2020-02-10
Payer: COMMERCIAL

## 2020-02-10 VITALS
HEART RATE: 66 BPM | TEMPERATURE: 97.6 F | DIASTOLIC BLOOD PRESSURE: 70 MMHG | OXYGEN SATURATION: 97 % | WEIGHT: 195 LBS | BODY MASS INDEX: 27.3 KG/M2 | SYSTOLIC BLOOD PRESSURE: 125 MMHG | HEIGHT: 71 IN | RESPIRATION RATE: 13 BRPM

## 2020-02-10 PROCEDURE — G0008: CPT

## 2020-02-10 PROCEDURE — 99214 OFFICE O/P EST MOD 30 MIN: CPT | Mod: 25

## 2020-02-10 PROCEDURE — 90686 IIV4 VACC NO PRSV 0.5 ML IM: CPT

## 2020-02-10 NOTE — PHYSICAL EXAM
[General Appearance - Alert] : alert [General Appearance - In No Acute Distress] : in no acute distress [General Appearance - Well Nourished] : well nourished [Outer Ear] : the ears and nose were normal in appearance [Sclera] : the sclera and conjunctiva were normal [Oropharynx] : the oropharynx was normal [Neck Appearance] : the appearance of the neck was normal [Neck Cervical Mass (___cm)] : no neck mass was observed [] : no respiratory distress [Abdomen Soft] : soft [Bowel Sounds] : normal bowel sounds [Abdomen Tenderness] : non-tender [Cervical Lymph Nodes Enlarged Anterior Bilaterally] : anterior cervical [Cervical Lymph Nodes Enlarged Posterior Bilaterally] : posterior cervical [Abnormal Walk] : normal gait [No CVA Tenderness] : no ~M costovertebral angle tenderness [Skin Color & Pigmentation] : normal skin color and pigmentation [No Focal Deficits] : no focal deficits [Oriented To Time, Place, And Person] : oriented to person, place, and time

## 2020-02-11 ENCOUNTER — OUTPATIENT (OUTPATIENT)
Dept: OUTPATIENT SERVICES | Facility: HOSPITAL | Age: 35
LOS: 1 days | End: 2020-02-11

## 2020-02-11 ENCOUNTER — APPOINTMENT (OUTPATIENT)
Dept: RADIOLOGY | Facility: CLINIC | Age: 35
End: 2020-02-11
Payer: COMMERCIAL

## 2020-02-11 LAB
25(OH)D3 SERPL-MCNC: 31.8 NG/ML
ALBUMIN SERPL ELPH-MCNC: 4.9 G/DL
ALP BLD-CCNC: 75 U/L
ALT SERPL-CCNC: 19 U/L
ANION GAP SERPL CALC-SCNC: 13 MMOL/L
AST SERPL-CCNC: 15 U/L
BASOPHILS # BLD AUTO: 0.04 K/UL
BASOPHILS NFR BLD AUTO: 0.7 %
BILIRUB SERPL-MCNC: 0.4 MG/DL
BUN SERPL-MCNC: 9 MG/DL
CALCIUM SERPL-MCNC: 9.8 MG/DL
CHLORIDE SERPL-SCNC: 105 MMOL/L
CO2 SERPL-SCNC: 22 MMOL/L
CREAT SERPL-MCNC: 0.87 MG/DL
CRP SERPL-MCNC: 0.23 MG/DL
EOSINOPHIL # BLD AUTO: 0.07 K/UL
EOSINOPHIL NFR BLD AUTO: 1.3 %
GLUCOSE SERPL-MCNC: 90 MG/DL
HCT VFR BLD CALC: 47 %
HGB BLD-MCNC: 15 G/DL
IMM GRANULOCYTES NFR BLD AUTO: 0.9 %
IRON SATN MFR SERPL: 44 %
IRON SERPL-MCNC: 137 UG/DL
LYMPHOCYTES # BLD AUTO: 2.51 K/UL
LYMPHOCYTES NFR BLD AUTO: 46.5 %
MAN DIFF?: NORMAL
MCHC RBC-ENTMCNC: 30.9 PG
MCHC RBC-ENTMCNC: 31.9 GM/DL
MCV RBC AUTO: 96.7 FL
MONOCYTES # BLD AUTO: 0.38 K/UL
MONOCYTES NFR BLD AUTO: 7 %
NEUTROPHILS # BLD AUTO: 2.35 K/UL
NEUTROPHILS NFR BLD AUTO: 43.6 %
PLATELET # BLD AUTO: 160 K/UL
POTASSIUM SERPL-SCNC: 4.4 MMOL/L
PROT SERPL-MCNC: 7 G/DL
RBC # BLD: 4.86 M/UL
RBC # FLD: 12.2 %
SODIUM SERPL-SCNC: 140 MMOL/L
TIBC SERPL-MCNC: 310 UG/DL
TSH SERPL-ACNC: 1.76 UIU/ML
UIBC SERPL-MCNC: 173 UG/DL
VIT B12 SERPL-MCNC: 524 PG/ML
WBC # FLD AUTO: 5.4 K/UL

## 2020-02-11 PROCEDURE — 77080 DXA BONE DENSITY AXIAL: CPT | Mod: 26

## 2020-02-12 NOTE — HISTORY OF PRESENT ILLNESS
[Heartburn] : denies heartburn [Nausea] : denies nausea [Vomiting] : denies vomiting [Diarrhea] : denies diarrhea [Constipation] : denies constipation [Yellow Skin Or Eyes (Jaundice)] : denies jaundice [Abdominal Pain] : denies abdominal pain [Abdominal Swelling] : abdominal swelling stable [Rectal Pain] : denies rectal pain [Kidney Stone] : kidney stone [Inflammatory Bowel Disease] : inflammatory bowel disease [Good Compliance] : good compliance with treatment [GERD] : no gastroesophageal reflux disease [Hiatus Hernia] : no hiatus hernia [Peptic Ulcer Disease] : no peptic ulcer disease [Pancreatitis] : no pancreatitis [Cholelithiasis] : no cholelithiasis [Irritable Bowel Syndrome] : no irritable bowel syndrome [Diverticulitis] : no diverticulitis [Alcohol Abuse] : no alcohol abuse [Malignancy] : no malignancy [Abdominal Surgery] : no abdominal surgery [Appendectomy] : no appendectomy [Cholecystectomy] : no cholecystectomy [de-identified] : 34 M, h/o IBD and G6PD deficiency, on Humira eow and mesalamine 4.8g daily eow, s/p colonoscopy March 2018 with mild erythma in rectum with histologic remission presents for routine follow up.  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 He reports that recently there was an issue with humira preauth and got it a few days late (saturday rather than Tuesday this past week). He did not feel any change in symptoms. \par \par Patient reports he has been feeling fatigued, tired, under the weather for the past 1 week. Of note over the past 3 months he took an immersive course in [employer] (he is making a career change) and the class was from 9am to 11pm and he felt it was very stressful. He was not really working out, gaining weight and may have contributed to him feeling a little worse. The course finished last week. \par \par Currently he reports 1-1.5BM/day. 50% of the time will have bright red blood and it's usually on the TP, not with the BM. He also reports this sensation of incomplete evacuation. Is not really straining to have a BM, but will sometimes sit on the toilet a bit longer.\par \par Last colonoscopy was in March 2018 and it showed mild erythema in the rectum but was consistent with endoscopic remission, histology with no abnormalities noted.\par \par He is still having some bloating sensation, sometimes painful. No N/V/F/C. Reviewed weight and has gone up from 184 to 195. \par \par Has been seeing psych for depression, continuing on zoloft and lamictal. Also getting therapy. Doing well, denies feeling depressed, no lack of interest, PHQ2 0. \par \par Traveled to [facility] with business in June 2019, they raised money to give water/electricity to a village there. \par \par ADA level in 2017 13.8 with no antibodies \par Labs from May 2019 reviewed\par CBC 8.46>17<190 MCV 96.4\par /4.2 99/27 9/0/.98 LFTs wnl\par CRP 0.23 (but always normal)\par B12 671 Folate 8.9 Iron 144 TIBC 288\par \par CT scan March 2017 WNL \par \par PAST DISEASE HISTORY\par 31 year old M with hx of g6pd deficiency, Crohn's disease (documentation of ileocolitis, visualization of rectosigmoid involvement on sigmoidoscopy) dx in 2013 with sx of abdominal bloating, blood in the stool. He has had symptoms since dx in 2013 and has never been in remission. Initially he was on Canasa+Lialda and then switched to Rowasa and Hydrocortisone enema which he stopped a few weeks ago because of burden of administration without relief of symptoms. \par \par He is currently taking Lialda but ran out of medications 2 days ago. He has 1-4 bowel movements/day, described as formed or loose which varies daily. He has persistent blood in stool each day, with tenesmus and denies black stools. He states within 1 hour of eating he has abdominal bloating sensation and abdominal protrusion. Symptoms are slightly improved from diagnosis, but no hx of resolution. \par \par He denies nausea, vomiting, fever, chills, joint pains, eye pain, rectal pain, oral ulcers, fistula/stricture formation, fecal incontinence or urgency. \par \par Dx studies on file: \par His last sigmoidoscopy was in Aug 2016 by Dr. Lay which was significant for edema and erythema in the rectum and sigmoid. Previous colonoscopy in 2014 was reviewed, with similar findings. No biopsy results available to review. VCE 2016 revealed mild edema and erythema in sigmoid colon and rectum. \par \par Social hx: wine few times per week, no marijuana use, no tobacco use, cocaine use twice in the last 6 months, E-cigarette use daily, avoiding NSAIDs  [de-identified] : blood in stool

## 2020-02-12 NOTE — ASSESSMENT
[FreeTextEntry1] : 34 Y M with moderate IBD unspecified, diagnosed 2013-14 (no path from 2014), in clinical, endo and histo remission on ADA eow and mesalamine 4.8 g daily., presents today for follow up visit. 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 Will check iron, B12, Vit D, TSH because of his reported fatigue. Also will check quantiferon since he went to [facility] June 2019\par - diet as tolerated, avoid food triggers\par - cont Lialda daily, Canasa PRN (has not been using suppositories)\par - plan for cscope given recent onset rectal bleeding to assess for rectal inflammation/disease activity v hemorrhoids\par - can try benefiber to see if it helps with sensation of incomplete evacuation\par \par Depression:\par - new meds; stable; being f/b psych and psychologist.\par \par Previously traveled to [facility] June 2019\par - was given typhoid in anticipation of travel\par - no yellow fever vaccine was given to patient prior to travel (since live vaccines c/i)\par - pending meningitis and Tdap vaccine history from [facility] (follow up next visit) \par \par HCM\par - flu shot today (we will administer in clinic)\par - prior to [facility] trip June 2019 had vaccine status checked and patient reports UTD (per our labs positive titers for mumps, rubella, varicella, hepatitis A, hepatitis B). HCV negative\par - need to discuss getting a PMD at the next visit, patient states currently PMD is Dr. Park, consider pneumococcal vaccinations with new PCP\par - annual derm visit - due now and emphasized importance (given referral today)\par - we are rechecking TB status after his trip to [facility] (quantiferon today as noted above)\par - continue Vit D and Ca\par - check Vit D, B12 and iron level today as noted above\par - DEXA scan ordered given hx of Budesonide for > 3 mo\par - tobacco/E-cig cessation discussed in detail\par - cont to avoid NSAIDs\par \par RTC post procedure\par \par Patient seen and discussed with Dr. Park\par Maira Heath\par IBD Fellow\par

## 2020-02-13 LAB
M TB IFN-G BLD-IMP: NEGATIVE
QUANTIFERON TB PLUS MITOGEN MINUS NIL: 8.32 IU/ML
QUANTIFERON TB PLUS NIL: 0.03 IU/ML
QUANTIFERON TB PLUS TB1 MINUS NIL: 0 IU/ML
QUANTIFERON TB PLUS TB2 MINUS NIL: -0.01 IU/ML

## 2020-02-18 LAB
ADALIMUMAB AB SERPL-MCNC: <25 NG/ML
ADALIMUMAB SERPL-MCNC: 11 UG/ML

## 2020-03-22 ENCOUNTER — RX RENEWAL (OUTPATIENT)
Age: 35
End: 2020-03-22

## 2020-04-10 ENCOUNTER — TRANSCRIPTION ENCOUNTER (OUTPATIENT)
Age: 35
End: 2020-04-10

## 2020-09-04 ENCOUNTER — RX RENEWAL (OUTPATIENT)
Age: 35
End: 2020-09-04

## 2020-09-16 ENCOUNTER — RX RENEWAL (OUTPATIENT)
Age: 35
End: 2020-09-16

## 2021-01-05 ENCOUNTER — RX RENEWAL (OUTPATIENT)
Age: 36
End: 2021-01-05

## 2021-02-05 DIAGNOSIS — K64.8 OTHER HEMORRHOIDS: ICD-10-CM

## 2021-02-10 ENCOUNTER — LABORATORY RESULT (OUTPATIENT)
Age: 36
End: 2021-02-10

## 2021-02-10 ENCOUNTER — APPOINTMENT (OUTPATIENT)
Dept: GASTROENTEROLOGY | Facility: CLINIC | Age: 36
End: 2021-02-10
Payer: COMMERCIAL

## 2021-02-10 VITALS
HEIGHT: 71 IN | DIASTOLIC BLOOD PRESSURE: 77 MMHG | TEMPERATURE: 98 F | BODY MASS INDEX: 27.16 KG/M2 | HEART RATE: 66 BPM | OXYGEN SATURATION: 98 % | RESPIRATION RATE: 15 BRPM | SYSTOLIC BLOOD PRESSURE: 110 MMHG | WEIGHT: 194 LBS

## 2021-02-10 PROCEDURE — 99072 ADDL SUPL MATRL&STAF TM PHE: CPT

## 2021-02-10 PROCEDURE — 99214 OFFICE O/P EST MOD 30 MIN: CPT | Mod: 25

## 2021-02-10 PROCEDURE — 36415 COLL VENOUS BLD VENIPUNCTURE: CPT | Mod: GC

## 2021-02-12 NOTE — ASSESSMENT
[FreeTextEntry1] : 35-year-old M with moderate IBD unspecified, diagnosed 2013-14 (no path from 2014), in clinical, endo and histo remission on ADA eow and mesalamine 4.8 g daily, presents today for follow up visit.  Patient here with increase rectal bleeding in the past month, improved in the last few days.  \par \par IBD unspecified: developed BRBPR.  Exam with external hemorrhoid without evidence of fissure or stigmata of bleeding.  Given temporal association of sx, suspect it is due to hemorrhoids rather than worsening disease activity.  Last Humira injection on 2/2/21\par - Continue ADA [id number] \par - Continue Lialda daily\par - Repeat colonoscopy to assess for disease activity and hemorrhoids\par - Obtain routine labs today\par - diet as tolerated, avoid food triggers\par \par Hemorrhoidal Bleeding - resolved\par Patient with recent BRBPR following each BM.  Denies blood mixed in stool consistent with hemorrhoidal disease.  \par - Hydrocortisone suppository prescribed previously and may continue PRN\par - Trial of Calmol\par - Colonoscopy as above\par - Discussed lifestyle modification to minimize irritation\par \par Depression:\par - f/u w/ psych and psychologist.\par \par HCM\par - Would readdress PMD followup\par - flu shot at next visit\par - Encourage annual derm visit \par - continue Vit D and Ca\par - DEXA 2/20: Below expected, repeat in 2 yr\par - tobacco/E-cig cessation discussed in detail\par - cont to avoid NSAIDs\par - covid vax letter/counseling\par \par RTC post procedure\par \par Jefferson Arevalo MD\par Gastroenterology Fellow

## 2021-02-12 NOTE — PHYSICAL EXAM
[General Appearance - Alert] : alert [General Appearance - In No Acute Distress] : in no acute distress [Sclera] : the sclera and conjunctiva were normal [Neck Appearance] : the appearance of the neck was normal [Exaggerated Use Of Accessory Muscles For Inspiration] : no accessory muscle use [Bowel Sounds] : normal bowel sounds [Abdomen Soft] : soft [Abdomen Tenderness] : non-tender [] : no hepato-splenomegaly [Normal Sphincter Tone] : normal sphincter tone [Internal Hemorrhoid] : internal hemorrhoids [Abnormal Walk] : normal gait [Musculoskeletal - Swelling] : no joint swelling seen [Skin Color & Pigmentation] : normal skin color and pigmentation [Skin Turgor] : normal skin turgor [Cranial Nerves] : cranial nerves 2-12 were intact [Deep Tendon Reflexes (DTR)] : deep tendon reflexes were 2+ and symmetric [Oriented To Time, Place, And Person] : oriented to person, place, and time [Impaired Insight] : insight and judgment were intact [External Hemorrhoid] : no external hemorrhoids [FreeTextEntry1] : No fissue, no stool or blood in rectal vault

## 2021-02-12 NOTE — HISTORY OF PRESENT ILLNESS
[FreeTextEntry1] : 35 M w/ h/o IBD and G6PD deficiency, on Humira eow and mesalamine 4.8g daily eow presents for followup\par \par Patient reports compliance with Humira injection, last dose on 2/2/21 without ADR.  In the past month, he noted rectal bleeding with every BM.  Normal BM initially followed by bloody output.  Denies spotting in undergarment or rectal irritation.  Denies fever, chill, cp, sob, abd pain, nausea, vomiting, diarrhea, constipation.  Having 1 formed stool per day.  Denies EIM.    \par \par Reports family with recent COVID19 infection, but he did not develop any symptoma jayceeladonnajurgenw wzpoauew.  \par \par Last colonoscopy was in March 2018 and it showed mild erythema in the rectum but was consistent with endoscopic remission, histology with no abnormalities noted.\par \par Traveled to [place] in January. \par \par PAST DISEASE HISTORY\par 31 year old M with hx of g6pd deficiency, Crohn's disease (documentation of ileocolitis, visualization of rectosigmoid involvement on sigmoidoscopy) dx in 2013 with sx of abdominal bloating, blood in the stool. He has had symptoms since dx in 2013 and has never been in remission. Initially he was on Canasa+Lialda and then switched to Rowasa and Hydrocortisone enema which he stopped a few weeks ago because of burden of administration without relief of symptoms. \par \par He is currently taking Lialda but ran out of medications 2 days ago. He has 1-4 bowel movements/day, described as formed or loose which varies daily. He has persistent blood in stool each day, with tenesmus and denies black stools. He states within 1 hour of eating he has abdominal bloating sensation and abdominal protrusion. Symptoms are slightly improved from diagnosis, but no hx of resolution. \par \par He denies nausea, vomiting, fever, chills, joint pains, eye pain, rectal pain, oral ulcers, fistula/stricture formation, fecal incontinence or urgency. \par \par 2/2020\par Patient reports he has been feeling fatigued, tired, under the weather for the past 1 week. 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 He was not really working out, gaining weight and may have contributed to him feeling a little worse. The course finished last week. \par he reports 1-1.5BM/day. 50% of the time will have bright red blood and it's usually on the TP, not with the BM. He also reports this sensation of incomplete evacuation. Is not really straining to have a BM, but will sometimes sit on the toilet a bit longer.He is still having some bloating sensation, sometimes painful. No N/V/F/C. Reviewed weight and has gone up from 184 to 195. \par Has been seeing psych for depression, continuing on zoloft and lamictal. Also getting therapy. Doing well, denies feeling depressed, no lack of interest, PHQ2 0. \par Traveled to [facility] with business in June 2019, they raised money to give water/electricity to a village there. \par \par ADA level in 2017 13.8 with no antibodies \par Labs from May 2019 reviewed\par CBC 8.46>17<190 MCV 96.4\par /4.2 99/27 9/0/.98 LFTs wnl\par CRP 0.23 (but always normal)\par B12 671 Folate 8.9 Iron 144 TIBC 288\par \par Dx studies on file: \par Colonoscopy  March 2018: mild erythema in the rectum but was consistent with endoscopic remission, histology with no abnormalities noted.\par Sigmoidoscopy Aug 2016: edema and erythema in the rectum and sigmoid. \par Colonoscopy 2014 was reviewed, with similar findings. No biopsy results available to review. VCE 2016 revealed mild edema and erythema in sigmoid colon and rectum. \par \par Social hx: wine few times per week, no marijuana use, no tobacco use, E-cigarette use daily, avoiding NSAIDs.

## 2021-03-16 ENCOUNTER — APPOINTMENT (OUTPATIENT)
Dept: GASTROENTEROLOGY | Facility: HOSPITAL | Age: 36
End: 2021-03-16

## 2021-04-14 ENCOUNTER — APPOINTMENT (OUTPATIENT)
Dept: GASTROENTEROLOGY | Facility: CLINIC | Age: 36
End: 2021-04-14

## 2021-08-05 ENCOUNTER — RX RENEWAL (OUTPATIENT)
Age: 36
End: 2021-08-05

## 2021-11-11 ENCOUNTER — TRANSCRIPTION ENCOUNTER (OUTPATIENT)
Age: 36
End: 2021-11-11

## 2022-06-28 ENCOUNTER — APPOINTMENT (OUTPATIENT)
Dept: GASTROENTEROLOGY | Facility: HOSPITAL | Age: 37
End: 2022-06-28

## 2022-07-13 ENCOUNTER — APPOINTMENT (OUTPATIENT)
Dept: GASTROENTEROLOGY | Facility: CLINIC | Age: 37
End: 2022-07-13

## 2022-07-13 VITALS
SYSTOLIC BLOOD PRESSURE: 110 MMHG | RESPIRATION RATE: 15 BRPM | TEMPERATURE: 98.1 F | DIASTOLIC BLOOD PRESSURE: 70 MMHG | OXYGEN SATURATION: 98 % | WEIGHT: 207 LBS | HEIGHT: 71 IN | HEART RATE: 62 BPM | BODY MASS INDEX: 28.98 KG/M2

## 2022-07-13 PROCEDURE — 99214 OFFICE O/P EST MOD 30 MIN: CPT

## 2022-07-13 RX ORDER — LAMOTRIGINE 200 MG/1
200 TABLET ORAL
Refills: 0 | Status: DISCONTINUED | COMMUNITY
End: 2022-07-13

## 2022-07-13 RX ORDER — HYDROCORTISONE ACETATE 25 MG/1
25 SUPPOSITORY RECTAL TWICE DAILY
Qty: 1 | Refills: 2 | Status: DISCONTINUED | COMMUNITY
Start: 2021-02-05 | End: 2022-07-13

## 2022-07-13 RX ORDER — SERTRALINE 25 MG/1
TABLET, FILM COATED ORAL
Refills: 0 | Status: DISCONTINUED | COMMUNITY
End: 2022-07-13

## 2022-07-13 RX ORDER — MESALAMINE 1000 MG/1
1000 SUPPOSITORY RECTAL
Qty: 30 | Refills: 2 | Status: DISCONTINUED | COMMUNITY
Start: 2017-03-21 | End: 2022-07-13

## 2022-07-15 NOTE — ASSESSMENT
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 (no path from 2014), in clinical, endo and histo remission on ADA eow and mesalamine 4.8 g daily\par \par Indeterminate colitis (labeled as crohns colitis in 2016 procedure note)\par - cbc cmp and crp from May 2022 reviewed - normal \par - Continue ADA [id number] - last level 2020 11 with no antibodies ; will repeat mid-cycle today \par - Continue Lialda daily\par - Repeat colonoscopy for surveillance - had planned to repeat in 2021 but pt had delayed (last eval 2018)\par - diet as tolerated, avoid food triggers\par \par Hemorrhoidal Bleeding - intermittent \par - Hydrocortisone suppository prescribed previously and may continue PRN\par - Colonoscopy as above\par - Discussed lifestyle modification to minimize irritation\par \par Depression:\par - f/u w/ psych and psychologist - pt reports stable \par \par HCM\par - emphasized derm visit \par - continue Vit D and Ca\par - DEXA 2/20: Below expected, repeat in 2 yr\par - tobacco/E-cig cessation discussed in detail - pt reports scarce use socially \par - cont to avoid NSAIDs\par \par RTC post procedure\par

## 2022-07-15 NOTE — PHYSICAL EXAM
[General Appearance - Alert] : alert [General Appearance - In No Acute Distress] : in no acute distress [Sclera] : the sclera and conjunctiva were normal [Neck Appearance] : the appearance of the neck was normal [Exaggerated Use Of Accessory Muscles For Inspiration] : no accessory muscle use [Bowel Sounds] : normal bowel sounds [Abdomen Soft] : soft [Abdomen Tenderness] : non-tender [] : no hepato-splenomegaly [Abnormal Walk] : normal gait [Musculoskeletal - Swelling] : no joint swelling seen [Skin Color & Pigmentation] : normal skin color and pigmentation [Skin Turgor] : normal skin turgor [Cranial Nerves] : cranial nerves 2-12 were intact [Deep Tendon Reflexes (DTR)] : deep tendon reflexes were 2+ and symmetric [Oriented To Time, Place, And Person] : oriented to person, place, and time [Impaired Insight] : insight and judgment were intact [FreeTextEntry1] : No aphthous ulcer [External Hemorrhoid] : no external hemorrhoids

## 2022-07-15 NOTE — HISTORY OF PRESENT ILLNESS
[FreeTextEntry1] : 35 M w/ h/o IBD and G6PD deficiency, on Humira eow and mesalamine 4.8g daily eow presents for followup\par \par Patient reports compliance with Humira injection, last dose on 2/2/21 without ADR.  In the past month, he noted rectal bleeding with every BM.  Normal BM initially followed by bloody output.  Denies spotting in undergarment or rectal irritation.  Denies fever, chill, cp, sob, abd pain, nausea, vomiting, diarrhea, constipation.  Having 1 formed stool per day.  Denies EIM.    \par \par Reports family with recent COVID19 infection, but he did not develop any symptoma jayceeladonnajurgenw wzpoauew.  \par \par Last colonoscopy was in March 2018 and it showed mild erythema in the rectum but was consistent with endoscopic remission, histology with no abnormalities noted.\par \par Traveled to [place] in January. \par \par PAST DISEASE HISTORY\par 31 year old M with hx of g6pd deficiency, Crohn's disease (documentation of ileocolitis, visualization of rectosigmoid involvement on sigmoidoscopy) dx in 2013 with sx of abdominal bloating, blood in the stool. He has had symptoms since dx in 2013 and has never been in remission. Initially he was on Canasa+Lialda and then switched to Rowasa and Hydrocortisone enema which he stopped a few weeks ago because of burden of administration without relief of symptoms. \par \par He is currently taking Lialda but ran out of medications 2 days ago. He has 1-4 bowel movements/day, described as formed or loose which varies daily. He has persistent blood in stool each day, with tenesmus and denies black stools. He states within 1 hour of eating he has abdominal bloating sensation and abdominal protrusion. Symptoms are slightly improved from diagnosis, but no hx of resolution. \par \par He denies nausea, vomiting, fever, chills, joint pains, eye pain, rectal pain, oral ulcers, fistula/stricture formation, fecal incontinence or urgency. \par \par 2/2020\par Patient reports he has been feeling fatigued, tired, under the weather for the past 1 week. 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 He was not really working out, gaining weight and may have contributed to him feeling a little worse. The course finished last week. \par he reports 1-1.5BM/day. 50% of the time will have bright red blood and it's usually on the TP, not with the BM. He also reports this sensation of incomplete evacuation. Is not really straining to have a BM, but will sometimes sit on the toilet a bit longer.He is still having some bloating sensation, sometimes painful. No N/V/F/C. Reviewed weight and has gone up from 184 to 195. \par Has been seeing psych for depression, continuing on zoloft and lamictal. Also getting therapy. Doing well, denies feeling depressed, no lack of interest, PHQ2 0. \par Traveled to [facility] with business in June 2019, they raised money to give water/electricity to a village there. \par \par ADA level in 2017 13.8 with no antibodies \par Labs from May 2019 reviewed\par CBC 8.46>17<190 MCV 96.4\par /4.2 99/27 9/0/.98 LFTs wnl\par CRP 0.23 (but always normal)\par B12 671 Folate 8.9 Iron 144 TIBC 288\par \par Dx studies on file: \par Colonoscopy  March 2018: mild erythema in the rectum but was consistent with endoscopic remission, histology with no abnormalities noted.\par Sigmoidoscopy Aug 2016: edema and erythema in the rectum and sigmoid. \par Colonoscopy 2014 was reviewed, with similar findings. No biopsy results available to review. VCE 2016 revealed mild edema and erythema in sigmoid colon and rectum. \par \par Social hx: wine few times per week, no marijuana use, no tobacco use, E-cigarette use daily, avoiding NSAIDs.

## 2022-07-21 LAB
ADALIMUMAB AB SERPL-MCNC: 59 NG/ML
ADALIMUMAB SERPL-MCNC: 8 UG/ML

## 2022-10-20 RX ORDER — ADALIMUMAB 40MG/0.4ML
40 KIT SUBCUTANEOUS
Qty: 1 | Refills: 4 | Status: ACTIVE | COMMUNITY
Start: 2018-01-02 | End: 1900-01-01

## 2022-12-27 ENCOUNTER — NON-APPOINTMENT (OUTPATIENT)
Age: 37
End: 2022-12-27

## 2023-04-19 ENCOUNTER — APPOINTMENT (OUTPATIENT)
Dept: GASTROENTEROLOGY | Facility: CLINIC | Age: 38
End: 2023-04-19
Payer: COMMERCIAL

## 2023-04-19 VITALS
SYSTOLIC BLOOD PRESSURE: 110 MMHG | HEART RATE: 71 BPM | WEIGHT: 196 LBS | OXYGEN SATURATION: 97 % | BODY MASS INDEX: 27.44 KG/M2 | HEIGHT: 71 IN | DIASTOLIC BLOOD PRESSURE: 80 MMHG | TEMPERATURE: 96.8 F | RESPIRATION RATE: 14 BRPM

## 2023-04-19 DIAGNOSIS — K64.4 RESIDUAL HEMORRHOIDAL SKIN TAGS: ICD-10-CM

## 2023-04-19 PROCEDURE — 99214 OFFICE O/P EST MOD 30 MIN: CPT

## 2023-04-19 RX ORDER — HYDROCORTISONE ACETATE 25 MG/1
25 SUPPOSITORY RECTAL
Qty: 30 | Refills: 2 | Status: ACTIVE | COMMUNITY
Start: 2023-04-19 | End: 1900-01-01

## 2023-04-23 NOTE — REVIEW OF SYSTEMS
[Negative] : Heme/Lymph [As Noted in HPI] : as noted in HPI [de-identified] : left hand pain/numbness

## 2023-04-23 NOTE — PHYSICAL EXAM
[Alert] : alert [Healthy Appearing] : healthy appearing [No Acute Distress] : no acute distress [Sclera] : the sclera and conjunctiva were normal [Hearing Threshold Finger Rub Not [place]] : hearing was normal [No Respiratory Distress] : no respiratory distress [Heart Rate And Rhythm] : heart rate was normal and rhythm regular [Bowel Sounds] : normal bowel sounds [Abdomen Tenderness] : non-tender [No Masses] : no abdominal mass palpated [Abdomen Soft] : soft [Supraclavicular Lymph Nodes Enlarged Bilaterally] : no supraclavicular lymphadenopathy [Axillary Lymph Nodes Enlarged Bilaterally] : no axillary lymphadenopathy [Inguinal Lymph Nodes Enlarged Bilaterally] : no inguinal lymphadenopathy [Cervical Lymph Nodes Enlarged Anterior Bilaterally] : no anterior cervical lymphadenopathy [Abnormal Walk] : normal gait [] : no rash [No Focal Deficits] : no focal deficits [Oriented To Time, Place, And Person] : oriented to person, place, and time [Normal Insight/Judgment] : insight and judgment were intact [de-identified] : left anterior neck mass extending to posterior neck

## 2023-04-23 NOTE — ASSESSMENT
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 ADA was stopped during diagnosis and has remained off ADA since then. 3/2023 PET scan with increased uptake in colon prompting colonoscopy which showed endoscopic+histologic remission of previous inflammation and no other suspicious lesions. Pt feeling well from GI standpoint with daily formed BM without urgency or abdominal pain. + blood on surface of stool and also "dripping" into toilet from known hemorrhoids which are at times uncomfortable. Denies constipation or excessive straining. \par \par # Indeterminate colitis (labeled as crohns colitis in 2016 procedure note); in remission\par - c/w daily mesalamine\par - extensive conversation had with pt regarding R/B of restarting ADA therapy at this time and pt opts not to restart ADA given deep remission on recent scope (after being off ADA for 2 months), ongoing therapy for desmoid tumor, and unknown risk of other malignancies given desmoid diagnosis.  His oncologist is not opposed and neither am I. \par - 4/18/23 CBC, CMP from [facility] reviewed and unremarkable\par - f/u fecal calpro prior to next visit in 3 months to monitor disease activity\par - 2018 and 2023 scope with deep remission; pending fecal calpro and clinical status off ADA, plan for surveillance scope in 1yr if he remains off therapy.\par \par # Hemorrhoidal Bleeding \par - Anucort suppository prescribed previously and may continue PRN\par - Discussed lifestyle modification to minimize irritation\par - consider CRC eval if non-responsive to conservative measures \par \par # Depression:\par - f/u w/ psych and psychologist - pt reports stable \par \par # HCM\par - DEXA 2/20: Below expected, due for repeat; continue Vit D and Ca for now\par - cont to avoid NSAIDs\par \par f/u in 4 months

## 2023-04-23 NOTE — HISTORY OF PRESENT ILLNESS
[FreeTextEntry1] : 38 y/o M with Hx indeterminate IBD dx 2014 on ADA q2 weeks + mesalamine 4.8g daily and G6PD deficiency, presenting for f/u after being dx with desmoid tumor of left neck 1/2023 and on Sorafenib therapy since that time. Given initial concern for lymphoma, ADA was stopped during diagnosis and has remained off ADA since then. 3/2023 PET scan with increased uptake in colon prompting colonoscopy which showed endoscopic+histologic remission of previous inflammation and no other suspicious lesions. Pt currently feeling well from GI standpoint with daily formed BM without urgency or abdominal pain. Still reporting blood on surface of stool and also "dripping" into toilet from known hemorrhoids which are at times uncomfortable. Denies constipation or excessive straining. \par \par \par Prior HPI:\par \par 35 M w/ h/o IBD and G6PD deficiency, on Humira eow and mesalamine 4.8g daily eow presents for followup\par \par Patient reports compliance with Humira injection, last dose on 2/2/21 without ADR. In the past month, he noted rectal bleeding with every BM. Normal BM initially followed by bloody output. Denies spotting in undergarment or rectal irritation. Denies fever, chill, cp, sob, abd pain, nausea, vomiting, diarrhea, constipation. Having 1 formed stool per day. Denies EIM. \par \par Reports family with recent COVID19 infection, but he did not develop any symptoma swapirw wzpoauew. \par \par Last colonoscopy was in March 2018 and it showed mild erythema in the rectum but was consistent with endoscopic remission, histology with no abnormalities noted.\par \par Traveled to [place] in January. \par \par PAST DISEASE HISTORY\par 31 year old M with hx of g6pd deficiency, Crohn's disease (documentation of ileocolitis, visualization of rectosigmoid involvement on sigmoidoscopy) dx in 2013 with sx of abdominal bloating, blood in the stool. He has had symptoms since dx in 2013 and has never been in remission. Initially he was on Canasa+Lialda and then switched to Rowasa and Hydrocortisone enema which he stopped a few weeks ago because of burden of administration without relief of symptoms. \par \par He is currently taking Lialda but ran out of medications 2 days ago. He has 1-4 bowel movements/day, described as formed or loose which varies daily. He has persistent blood in stool each day, with tenesmus and denies black stools. He states within 1 hour of eating he has abdominal bloating sensation and abdominal protrusion. Symptoms are slightly improved from diagnosis, but no hx of resolution. \par \par He denies nausea, vomiting, fever, chills, joint pains, eye pain, rectal pain, oral ulcers, fistula/stricture formation, fecal incontinence or urgency. \par \par 2/2020\par Patient reports he has been feeling fatigued, tired, under the weather for the past 1 week. Of note over the past 3 months he took an immersive course in [employer] (he is making a career change) and the class was from 9am to 11pm and he felt it was very stressful. He was not really working out, gaining weight and may have contributed to him feeling a little worse. The course finished last week. \par he reports 1-1.5BM/day. 50% of the time will have bright red blood and it's usually on the TP, not with the BM. He also reports this sensation of incomplete evacuation. Is not really straining to have a BM, but will sometimes sit on the toilet a bit longer.He is still having some bloating sensation, sometimes painful. No N/V/F/C. Reviewed weight and has gone up from 184 to 195. \par Has been seeing psych for depression, continuing on zoloft and lamictal. Also getting therapy. Doing well, denies feeling depressed, no lack of interest, PHQ2 0. \par Traveled to [facility] with business in June 2019, they raised money to give water/electricity to a village there. \par \par ADA level in 2017 13.8 with no antibodies \par Labs from May 2019 reviewed\par CBC 8.46>17<190 MCV 96.4\par /4.2 99/27 9/0/.98 LFTs wnl\par CRP 0.23 (but always normal)\par B12 671 Folate 8.9 Iron 144 TIBC 288\par \par Dx studies on file: \par Colonoscopy March 2018: mild erythema in the rectum but was consistent with endoscopic remission, histology with no abnormalities noted.\par Sigmoidoscopy Aug 2016: edema and erythema in the rectum and sigmoid. \par Colonoscopy 2014 was reviewed, with similar findings. No biopsy results available to review. VCE 2016 revealed mild edema and erythema in sigmoid colon and rectum. \par \par Social hx: wine few times per week, no marijuana use, no tobacco use, E-cigarette use daily, avoiding NSAIDs.

## 2023-04-27 RX ORDER — HYDROCORTISONE 25 MG/G
2.5 CREAM TOPICAL
Qty: 1 | Refills: 3 | Status: ACTIVE | COMMUNITY
Start: 2023-04-27 | End: 1900-01-01

## 2023-05-01 RX ORDER — HYDROCORTISONE 10 MG/G
1 CREAM TOPICAL
Qty: 1 | Refills: 2 | Status: ACTIVE | COMMUNITY
Start: 2023-05-01 | End: 1900-01-01

## 2023-07-20 ENCOUNTER — LABORATORY RESULT (OUTPATIENT)
Age: 38
End: 2023-07-20

## 2023-07-26 ENCOUNTER — APPOINTMENT (OUTPATIENT)
Dept: GASTROENTEROLOGY | Facility: CLINIC | Age: 38
End: 2023-07-26
Payer: COMMERCIAL

## 2023-07-26 PROCEDURE — 99213 OFFICE O/P EST LOW 20 MIN: CPT | Mod: 95

## 2023-07-27 NOTE — ASSESSMENT
[FreeTextEntry1] : This is a 37 year old male with indeterminate IBD diagnosed in 2014 currently taking mesalamine 4.8g daily, G6PD deficiency, and desmoid tumor of left neck diagnosed 1/2023 taking Sorafenib. He was discontinued from ADA when there was initial concern for lymphoma and has remained off ADA since 02/2023.PET scan in 3/2023 which demonstrated increased uptake in colon prompting colonoscopy which showed endoscopic+histologic remission of previous inflammation and no other suspicious lesions.\par \par # Indeterminate colitis (labeled as Crohn's colitis in 2016 procedure note); in remission\par - Fecal calpro 7/2023 within normal limits\par - Continue current mesalamine 4.8g orally daily\par - Will continue to monitor patient off ADA while undergoing chemotherapy for left neck desmoid tumor and while in clinical, endoscopic, and histologic remission\par - Colonoscopy if symptoms (maybe related to new chemo)\par \par #Desmoid tumor\par - failed sorafenib\par - starting new chemotherapy - doxorubicin based \par \par # Hemorrhoidal Bleeding \par - Anucort suppository prescribed previously and may continue PRN\par \par # Depression\par - Follow up with psych and psychologist - pt reports stable \par \par # HCM\par - DEXA 2/20: Below expected, due for repeat; continue Vit D and Ca for now\par - cont to avoid NSAIDs\par \par Follow up in 3 months for symptom evaluation off ADA\par \par Eliel Salazar DO

## 2023-07-27 NOTE — PHYSICAL EXAM
[Alert] : alert [Healthy Appearing] : healthy appearing [No Acute Distress] : no acute distress [Sclera] : the sclera and conjunctiva were normal [Hearing Threshold Finger Rub Not [place]] : hearing was normal [No Respiratory Distress] : no respiratory distress [Heart Rate And Rhythm] : heart rate was normal and rhythm regular [Bowel Sounds] : normal bowel sounds [Abdomen Tenderness] : non-tender [No Masses] : no abdominal mass palpated [Abdomen Soft] : soft [Supraclavicular Lymph Nodes Enlarged Bilaterally] : no supraclavicular lymphadenopathy [Axillary Lymph Nodes Enlarged Bilaterally] : no axillary lymphadenopathy [Inguinal Lymph Nodes Enlarged Bilaterally] : no inguinal lymphadenopathy [Cervical Lymph Nodes Enlarged Anterior Bilaterally] : no anterior cervical lymphadenopathy [Abnormal Walk] : normal gait [] : no rash [No Focal Deficits] : no focal deficits [Oriented To Time, Place, And Person] : oriented to person, place, and time [Normal Insight/Judgment] : insight and judgment were intact [de-identified] : left anterior neck mass extending to posterior neck

## 2023-07-27 NOTE — REVIEW OF SYSTEMS
[As Noted in HPI] : as noted in HPI [Negative] : Heme/Lymph [de-identified] : left hand pain/numbness

## 2023-07-27 NOTE — HISTORY OF PRESENT ILLNESS
[FreeTextEntry1] : This is a 37 year old male with indeterminate IBD diagnosed in 2014 currently taking mesalamine 4.8g daily, G6PD deficiency, and desmoid tumor of left neck diagnosed 1/2023 taking Sorafenib who presents today as a televisit for follow up. He was discontinued from ADA when there was initial concern for lymphoma and has remained off ADA since 02/2023. He underwent PET scan in 3/2023 which demonstrated increased uptake in colon prompting colonoscopy which showed endoscopic+histologic remission of previous inflammation and no other suspicious lesions. He describes remaining asymptomatic since that time. Denies abdominal pain, nausea, vomiting, fever, and bloating. No extraintestinal manifestations. States that he is having change in therapy for his desmoid tumor with discontinuation of sorafenib tomorrow and initiation of a new medication next week with this oncologist Dr. Cameron.

## 2024-02-22 RX ORDER — MESALAMINE 1.2 G/1
1.2 TABLET, DELAYED RELEASE ORAL
Qty: 360 | Refills: 3 | Status: ACTIVE | COMMUNITY
Start: 2018-07-12 | End: 1900-01-01

## 2024-11-19 ENCOUNTER — RX RENEWAL (OUTPATIENT)
Age: 39
End: 2024-11-19

## 2025-02-12 ENCOUNTER — RX RENEWAL (OUTPATIENT)
Age: 40
End: 2025-02-12

## 2025-02-13 ENCOUNTER — RX RENEWAL (OUTPATIENT)
Age: 40
End: 2025-02-13

## 2025-02-14 ENCOUNTER — RX RENEWAL (OUTPATIENT)
Age: 40
End: 2025-02-14

## 2025-09-16 ENCOUNTER — APPOINTMENT (OUTPATIENT)
Dept: GASTROENTEROLOGY | Facility: CLINIC | Age: 40
End: 2025-09-16

## 2025-09-19 ENCOUNTER — APPOINTMENT (OUTPATIENT)
Dept: GASTROENTEROLOGY | Facility: CLINIC | Age: 40
End: 2025-09-19
Payer: COMMERCIAL

## 2025-09-19 DIAGNOSIS — D48.119 DESMOID TUMOR OF UNSPECIFIED SITE: ICD-10-CM

## 2025-09-19 DIAGNOSIS — D75.A GLUCOSE-6-PHOSPHATE DEHYDROGENASE: ICD-10-CM

## 2025-09-19 DIAGNOSIS — K50.90 CROHN'S DISEASE, UNSPECIFIED, W/OUT COMPLICATIONS: ICD-10-CM

## 2025-09-19 PROCEDURE — 99214 OFFICE O/P EST MOD 30 MIN: CPT | Mod: 95

## 2025-09-19 RX ORDER — MESALAMINE 1000 MG/1
1000 SUPPOSITORY RECTAL
Qty: 1 | Refills: 2 | Status: ACTIVE | COMMUNITY
Start: 2025-09-19 | End: 1900-01-01

## 2025-09-19 RX ORDER — NIROGACESTAT 150 MG/1
TABLET, FILM COATED ORAL
Refills: 0 | Status: ACTIVE | COMMUNITY